# Patient Record
(demographics unavailable — no encounter records)

---

## 2025-06-14 NOTE — PHYSICAL EXAM
[FreeTextEntry3] : brown 3-4 mm papule on the back  several skin-colored papules on the face and upper trunk

## 2025-06-14 NOTE — HISTORY OF PRESENT ILLNESS
[FreeTextEntry1] : growth-NPA [de-identified] : 41 y/o female presents c/o growth. location: back duration: unsure No current symptoms  Considering removal of several lesions on the face/trunk.  No personal hx of skin cancer No family hx of skin cancer

## 2025-06-14 NOTE — HISTORY OF PRESENT ILLNESS
[FreeTextEntry1] : growth-NPA [de-identified] : 41 y/o female presents c/o growth. location: back duration: unsure No current symptoms  Considering removal of several lesions on the face/trunk.  No personal hx of skin cancer No family hx of skin cancer

## 2025-06-14 NOTE — ASSESSMENT
[FreeTextEntry1] : 1) Acquired melanocytic nevi, back/face/upper trunk - Counseled about clinically benign lesions - Discussed regular OTC sunscreen use, SPF 30 or higher  - Discussed consideration of shave removal of several of the dermal nevi. Patient plans to RTC when interested in treatment  RTC prn